# Patient Record
Sex: FEMALE | Race: BLACK OR AFRICAN AMERICAN | NOT HISPANIC OR LATINO | URBAN - METROPOLITAN AREA
[De-identification: names, ages, dates, MRNs, and addresses within clinical notes are randomized per-mention and may not be internally consistent; named-entity substitution may affect disease eponyms.]

---

## 2024-04-25 ENCOUNTER — TELEPHONE (OUTPATIENT)
Dept: OPHTHALMOLOGY | Facility: CLINIC | Age: 59
End: 2024-04-25
Payer: MEDICAID

## 2024-04-25 NOTE — TELEPHONE ENCOUNTER
Referral received for PPV/ PRP form Dr. Caba office, called their office spoke to Roxane informed her that we do not have Retina surgeon, they can refer to Dr. Mcallister private office or we send our patients to [facility] in [place]. Roxane verbalizes understanding and will make sure provider gets message, also encouraged that any urgent referrals to please call our office in addition to faxing referral.

## 2024-05-01 ENCOUNTER — CLINICAL SUPPORT (OUTPATIENT)
Dept: OPHTHALMOLOGY | Facility: CLINIC | Age: 59
End: 2024-05-01

## 2024-05-01 VITALS — HEIGHT: 63 IN | BODY MASS INDEX: 31.01 KG/M2 | WEIGHT: 175 LBS

## 2024-05-01 DIAGNOSIS — H21.1X3 RUBEOSIS IRIDIS OF BOTH EYES: ICD-10-CM

## 2024-05-01 DIAGNOSIS — H43.11 VITREOUS HEMORRHAGE OF RIGHT EYE: ICD-10-CM

## 2024-05-01 DIAGNOSIS — E11.3593 PROLIFERATIVE DIABETIC RETINOPATHY OF BOTH EYES ASSOCIATED WITH TYPE 2 DIABETES MELLITUS, MACULAR EDEMA PRESENCE UNSPECIFIED: Primary | ICD-10-CM

## 2024-05-01 PROCEDURE — 92134 CPTRZ OPH DX IMG PST SGM RTA: CPT | Mod: PBBFAC,PN | Performed by: OPHTHALMOLOGY

## 2024-05-01 PROCEDURE — 76512 OPH US DX B-SCAN: CPT | Mod: PBBFAC,PN,GC

## 2024-05-01 PROCEDURE — 76512 OPH US DX B-SCAN: CPT | Mod: 50,PBBFAC,PN | Performed by: OPHTHALMOLOGY

## 2024-05-01 PROCEDURE — 99213 OFFICE O/P EST LOW 20 MIN: CPT | Mod: PBBFAC,PN,25

## 2024-05-01 RX ORDER — LANCETS 33 GAUGE
EACH MISCELLANEOUS 2 TIMES DAILY
COMMUNITY
Start: 2023-11-24

## 2024-05-01 RX ORDER — PREDNISOLONE ACETATE 10 MG/ML
1 SUSPENSION/ DROPS OPHTHALMIC 4 TIMES DAILY
COMMUNITY
Start: 2024-03-08

## 2024-05-01 RX ORDER — EZETIMIBE 10 MG
TABLET ORAL
COMMUNITY
Start: 2024-04-29

## 2024-05-01 RX ORDER — METFORMIN HYDROCHLORIDE 500 MG/1
TABLET ORAL
COMMUNITY

## 2024-05-01 RX ORDER — AMLODIPINE BESYLATE 10 MG/1
10 TABLET ORAL NIGHTLY
COMMUNITY
Start: 2023-12-21

## 2024-05-01 RX ORDER — OLMESARTAN MEDOXOMIL AND HYDROCHLOROTHIAZIDE 40/25 40; 25 MG/1; MG/1
1 TABLET ORAL
COMMUNITY
Start: 2024-04-10

## 2024-05-01 RX ORDER — BISOPROLOL FUMARATE 5 MG/1
5 TABLET, FILM COATED ORAL
COMMUNITY
Start: 2024-04-27

## 2024-05-01 RX ORDER — ATORVASTATIN CALCIUM 40 MG/1
TABLET, FILM COATED ORAL
COMMUNITY

## 2024-05-01 RX ORDER — CLONIDINE HYDROCHLORIDE 0.1 MG/1
0.1 TABLET ORAL
COMMUNITY

## 2024-05-01 RX ADMIN — BALANCED SALT SOLUTION 15 ML: 6.4; .75; .48; .3; 3.9; 1.7 SOLUTION OPHTHALMIC at 10:05

## 2024-05-01 RX ADMIN — Medication 1.25 MG: at 10:05

## 2024-05-01 NOTE — PROGRESS NOTES
"  HPI     vitreous hemorrhage OD     Additional comments: Referred by Dr. Noonan DT insurance unable to   proceed with sx.             Comments    Patient also DMII:   Last A1c 4/29/24 7.3%;  12/27/23 6.0%  Last blood glucose levels: 4/29/24 226 12/7/23 = 156          Last edited by Nelly Gramajo LPN on 5/1/2024  8:59 AM.              Assessment /Plan     For exam results, see Encounter Report.    Proliferative diabetic retinopathy of both eyes associated with type 2 diabetes mellitus, macular edema presence unspecified  -     bevacizumab (Avastin) 25 mg/mL ophthalmic injection syringe 1.25 mg  -     balanced salt irrigation intra-ocular solution    Vitreous hemorrhage of right eye  -     bevacizumab (Avastin) 25 mg/mL ophthalmic injection syringe 1.25 mg  -     balanced salt irrigation intra-ocular solution    Rubeosis iridis of both eyes      OCT Mac 05/01/2024:  OD: unable  OS: good foveal contour, few exudates no SRF/IRF    Bscan 5/1/24: VH, Retina flat/attached, no masses     1. DM2 w/ PDR and VH, OD  - Last A1c 7.3% 04/2024 per patient   - Encouraged good BG/HTN control  - Patient with new VH and HM vision referred for "PPV/PRP" from Dr. Caba's office  - Reports 2mo since VH OD without changes in vision since onset   - 5/1/24: VH x2mo, NVI at temp margin, no NVA. Bscan with retina flat/attached, dense VH. R/B/A to EUGENE OD discussed today, pt wishes to proceed   - EUGENE OD #1 given 5/1/24   - Referral to [facility] in 4 weeks for eval of NCVH OD (x2mo)    2. DM2 w/ PDR, OS  - OCTm 5/1/24: no diabetic macular edema   - 5/1/24: NVI S/N/T at margin, no NVA.   - Needs tx OS but PRP will be difficult 2/2 asteroid hyalosis. Will have [facility] eval at visit for above   - Plan for EUGENE OS in 2 weeks for PDR with NVI and poor view for PRP     3. Asteroid Hyalosis, OS  - Stable, CTM     4. Combined Cataract, OU   - NVS  - CTM     RTC [facility] procedure day 2 weeks for EUGENE OS   Referral to [facility] in 4 weeks for eval of " NCVH OD (x2mo) and PDR OU with asteroid hyalosis OS and difficult view for PRP OS         Procedure note: Intravitreal injection of Avastin Right eye  Pre-and post-procedure diagnosis:  PDR w/ VH     I have explained the risks, benefits, and alternatives of the procedure in detail. The patient voices understanding and all questions have been answered.  The patient agrees to proceed as planned. Signed consent was obtained, the site was marked, and a timeout was performed. Topical proparacaine 0.5% was used for anesthesia. A lid speculum was placed. Topical Povidone-Iodine 5% was used for antisepsis. 0.05 cc of Avastin was injected 4 mm (3.5mm if pseudophakic) from corneal limbus in the inferotemporal position. The eye was then thoroughly irrigated with sterile saline.  No complications were observed, the patient tolerated procedure well, and vision was confirmed to be count fingers or better after the injection.  The patient was educated that mild irritation tonight and tomorrow was normal secondary to topical Povidone-Iodine use, and was further educated that if significant pain or vision loss in occurred, they should return immediately to our clinic or the ED.

## 2024-05-03 ENCOUNTER — TELEPHONE (OUTPATIENT)
Dept: OPHTHALMOLOGY | Facility: CLINIC | Age: 59
End: 2024-05-03

## 2024-05-03 NOTE — TELEPHONE ENCOUNTER
Post procedure f/u call. Patient denies having any pain or discomfort. No other complaints were voiced. Pt verbalized understanding of post procedure instructions. Encouraged to give us a call if have any questions or concerns.

## 2024-06-05 ENCOUNTER — CLINICAL SUPPORT (OUTPATIENT)
Dept: OPHTHALMOLOGY | Facility: CLINIC | Age: 59
End: 2024-06-05
Payer: COMMERCIAL

## 2024-06-05 VITALS — WEIGHT: 174.19 LBS | HEIGHT: 63 IN | BODY MASS INDEX: 30.86 KG/M2

## 2024-06-05 DIAGNOSIS — H21.1X2 RUBEOSIS IRIDIS OF LEFT EYE: ICD-10-CM

## 2024-06-05 DIAGNOSIS — E11.3593 PROLIFERATIVE DIABETIC RETINOPATHY OF BOTH EYES ASSOCIATED WITH TYPE 2 DIABETES MELLITUS, MACULAR EDEMA PRESENCE UNSPECIFIED: Primary | ICD-10-CM

## 2024-06-05 DIAGNOSIS — H43.11 VITREOUS HEMORRHAGE OF RIGHT EYE: ICD-10-CM

## 2024-06-05 PROCEDURE — 67028 INJECTION EYE DRUG: CPT | Mod: PBBFAC,PN

## 2024-06-05 PROCEDURE — 99213 OFFICE O/P EST LOW 20 MIN: CPT | Mod: PBBFAC,PN,25

## 2024-06-05 RX ADMIN — BALANCED SALT SOLUTION 15 ML: 6.4; .75; .48; .3; 3.9; 1.7 SOLUTION OPHTHALMIC at 08:06

## 2024-06-05 RX ADMIN — Medication 1.25 MG: at 08:06

## 2024-06-05 NOTE — PROGRESS NOTES
"HPI    [facility] procedure day 2 weeks for EUGENE OS   Last edited by Yamel Wooten RN on 6/5/2024  7:50 AM.        Assessment /Plan     OCT Mac 5/1/24  OD: unable  OS: good foveal contour, few exudates no SRF/IRF    Bscan 5/1/24: VH, Retina flat/attached, no masses     1. DM2 w/ PDR and VH, OD  - Last A1c 7.3% 04/2024 per patient   - Encouraged good BG/HTN control  - Patient with new VH and HM vision referred for "PPV/PRP" from Dr. Caba's office  - Reports 2mo since VH OD without changes in vision since onset   - 5/1/24: VH x2mo, NVI at temp margin, no NVA. B scan with retina flat/attached, dense VH. R/B/A to EUGENE OD discussed today, pt wishes to proceed   - EUGENE OD #1 given 5/1/24   - Pt will be seen [facility] 6/27/24    2. DM2 w/ PDR, OS  3. NVI, OS  - OCTm 5/1/24: no diabetic macular edema   - 6/5/24:  at pupillary margin  - Needs tx OS but PRP will be difficult 2/2 asteroid hyalosis  - EUGENE OS #1 given 6/5/24    4. Asteroid Hyalosis, OS  - Stable, CTM     5. Combined Cataract, OU   - NVS  - CTM     [facility] procedure day 4 weeks iris check OS, poss EUGENE OS  Will be seen at [facility] (scheduled 6/27/24) for eval of NCVH OD (x2mo) and PDR OU with asteroid hyalosis OS and difficult view for PRP OS      Avastin procedure note  Procedure: Avastin (bevacizumab) intravitreal injection, left eye  Indication: Neovascularization of iris, left eye    Risks, benefits, and alternatives of procedure were discussed.  Patient voiced understanding, all questions were answered, and the patient elected to proceed with procedure.  Informed consent was obtained.     A time-out was performed confirming correct patient, procedure, and site. The eye was anesthetized with topical proparacaine, 0.5%. The lid margins and conjunctiva were sterilized with topical betadine. A sterile lid speculum was placed. Avastin 1.25mg was injected intravitreally in the inferotemporal quadrant, 4 mm from the limbus. Vision was confirmed with finger " counting at 2'. The eye was irrigated with sterile eye wash.  There were no complications.  The patient tolerated the procedure well.  The patient was educated that mild irritation tonight was normal secondary to topical Betadine use.  Patient was informed to follow up immediately if any floaters, flashes, vision loss, severe pain, or other concerns arose.

## 2024-06-06 ENCOUNTER — TELEPHONE (OUTPATIENT)
Dept: OPHTHALMOLOGY | Facility: CLINIC | Age: 59
End: 2024-06-06
Payer: COMMERCIAL

## 2024-07-03 ENCOUNTER — CLINICAL SUPPORT (OUTPATIENT)
Dept: OPHTHALMOLOGY | Facility: CLINIC | Age: 59
End: 2024-07-03
Payer: COMMERCIAL

## 2024-07-03 VITALS — BODY MASS INDEX: 30.86 KG/M2 | WEIGHT: 174.19 LBS | HEIGHT: 63 IN

## 2024-07-03 DIAGNOSIS — H21.1X2 RUBEOSIS IRIDIS OF LEFT EYE: ICD-10-CM

## 2024-07-03 DIAGNOSIS — E11.3593 PROLIFERATIVE DIABETIC RETINOPATHY OF BOTH EYES ASSOCIATED WITH TYPE 2 DIABETES MELLITUS, MACULAR EDEMA PRESENCE UNSPECIFIED: Primary | ICD-10-CM

## 2024-07-03 DIAGNOSIS — H43.11 VITREOUS HEMORRHAGE OF RIGHT EYE: ICD-10-CM

## 2024-07-03 PROCEDURE — 67028 INJECTION EYE DRUG: CPT | Mod: PBBFAC,PN

## 2024-07-03 PROCEDURE — 92134 CPTRZ OPH DX IMG PST SGM RTA: CPT | Mod: PBBFAC,PN | Performed by: OPHTHALMOLOGY

## 2024-07-03 PROCEDURE — 99213 OFFICE O/P EST LOW 20 MIN: CPT | Mod: PBBFAC,PN

## 2024-07-03 PROCEDURE — 92134 CPTRZ OPH DX IMG PST SGM RTA: CPT | Mod: PBBFAC,PN

## 2024-07-03 RX ORDER — CALCIUM CITRATE/VITAMIN D3 200MG-6.25
1 TABLET ORAL 2 TIMES DAILY
COMMUNITY
Start: 2024-06-03

## 2024-07-03 RX ORDER — CYCLOBENZAPRINE HCL 5 MG
5 TABLET ORAL 3 TIMES DAILY PRN
COMMUNITY

## 2024-07-03 RX ORDER — GLIPIZIDE 10 MG/1
10 TABLET ORAL 2 TIMES DAILY WITH MEALS
COMMUNITY

## 2024-07-03 RX ORDER — LOSARTAN POTASSIUM AND HYDROCHLOROTHIAZIDE 25; 100 MG/1; MG/1
1 TABLET ORAL DAILY
COMMUNITY

## 2024-07-03 RX ADMIN — Medication 1.25 MG: at 10:07

## 2024-07-03 NOTE — PROGRESS NOTES
"HPI     Procedure Brief     Additional comments: EUGENE OS  FMLA paperwork for daughter needs to be completed          Last edited by Nikia Forrester LPN on 7/3/2024  9:54 AM.            Assessment /Plan     For exam results, see Encounter Report.    There are no diagnoses linked to this encounter.    OCT Mac 7/3/24  OD: unable  OS: good foveal contour, few exudates no SRF/IRF - stable    OCT Mac 5/1/24  OD: unable  OS: good foveal contour, few exudates no SRF/IRF    Bscan 5/1/24: VH, Retina flat/attached, no masses     1. DM2 w/ PDR and VH, OD  - Last A1c 7.3% 04/2024 per patient   - Encouraged good BG/HTN control  - Patient with new VH and HM vision referred for "PPV/PRP" from Dr. Caba's office  - Reports 2mo since VH OD without changes in vision since onset   - 5/1/24: VH x2mo, NVI at temp margin, no NVA. B scan with retina flat/attached, dense VH. R/B/A to EUGENE OD discussed today, pt wishes to proceed   - 7/3/24: Stable NCVH, no NVI noted. Per chart review of [facility] records would recommend bilateral EUGENE prior to surgery. Will plan to sequentially inject today starting with OD.   - EUGENE OD #1 given 5/1/24, 7/3/24  - Pt plan for surgery 7/25/24 with PPV for NCVH in [place] with [facility]    2. DM2 w/ PDR, OS  3. NVI, OS  - OCTm 5/1/24: no diabetic macular edema   - EUGENE OS #1 given 6/5/24  - PRP OS partial PRP peripherally though difficult with AH  - 6/5/24:  at pupillary margin  - 7/3/24: Improving NVI to approx 2 clock hours of NVI superiorly. Will repeat EUGENE in 1 week.     4. Asteroid Hyalosis, OS  - Stable, CTM     5. Combined Cataract, OU   - NVS  - CTM     RTC 1 week for DFE/OCTm/poss EUGENE OS  Planning for PPV for NCVH in [place] on 7/25/24      Procedure Note, Intravitreal Avastin, Right Eye  Pre-procedure diagnosis: NCVH, PDR OD  Post-procedure diagnosis: Same as pre-procedure diagnosis  Surgeon: Fareed Kearney III, MD  Attending: Alma Khan MD  Anti-sepsis: Betadine  Anesthesia: Topical tetracaine, " topical proparacaine  Blood loss: None  Complications: None    Procedure in detail:    The procedure was explained in detail to the patient including risks, benefits, alternatives. Informed consent was obtained from the patient and signed, witnessed, and placed in the chart. A time out was performed to identify the correct patient with two identifiers and correct site.     Topical tetracaine was instilled in the eye. A drop of moxifloxacin was instilled in the eye. Topical betadine was used for antisepsis. 0.05 cc Avastin was injected 3.5-4.0 mm from corneal limbus in the inferotemporal position. The vision was confirmed to be CF @ 2 feet following the injection. The eye was then thoroughly irrigated with BSS. Another drop of moxifloxacin was instilled in the eye. The patient tolerated procedure well.  No complications were observed. The patient was educated that mild irritation tonight was normal secondary to topical Betadine use. The patient was educated on return precautions for endophthalmitis and a retinal detachment.

## 2024-07-03 NOTE — LETTER
July 3, 2024      [facility]  [address] [place] [address]  Phone: [phone]       Patient: Parul Sanford   YOB: 1965  Date of Visit: 07/03/2024    To Whom It May Concern:    Nazanin Sanford  accompanied by daughter and was at [facility] on 07/03/2024. The patient may return to work/school on 7/03/2024 with no restrictions. If you have any questions or concerns, or if I can be of further assistance, please do not hesitate to contact me.    Sincerely,    Savanah Toth MA

## 2024-07-05 ENCOUNTER — TELEPHONE (OUTPATIENT)
Dept: OPHTHALMOLOGY | Facility: CLINIC | Age: 59
End: 2024-07-05
Payer: COMMERCIAL

## 2024-07-05 NOTE — TELEPHONE ENCOUNTER
Post procedure follow up call. Spoke with the patient and did not have any issues or concerns at this time. Informed the patient to call if they have any issues or problems that come up before their next appointment. Patient voiced understanding and appreciation.

## 2024-07-10 ENCOUNTER — CLINICAL SUPPORT (OUTPATIENT)
Dept: OPHTHALMOLOGY | Facility: CLINIC | Age: 59
End: 2024-07-10
Payer: COMMERCIAL

## 2024-07-10 VITALS — HEIGHT: 63 IN | WEIGHT: 174.19 LBS | BODY MASS INDEX: 30.86 KG/M2

## 2024-07-10 DIAGNOSIS — H21.1X3 RUBEOSIS IRIDIS OF BOTH EYES: ICD-10-CM

## 2024-07-10 DIAGNOSIS — E11.3593 PROLIFERATIVE DIABETIC RETINOPATHY OF BOTH EYES ASSOCIATED WITH TYPE 2 DIABETES MELLITUS, MACULAR EDEMA PRESENCE UNSPECIFIED: Primary | ICD-10-CM

## 2024-07-10 DIAGNOSIS — H43.11 VITREOUS HEMORRHAGE OF RIGHT EYE: ICD-10-CM

## 2024-07-10 PROCEDURE — 92134 CPTRZ OPH DX IMG PST SGM RTA: CPT | Mod: PBBFAC,PN | Performed by: OPHTHALMOLOGY

## 2024-07-10 PROCEDURE — 99213 OFFICE O/P EST LOW 20 MIN: CPT | Mod: PBBFAC,PN,25

## 2024-07-10 RX ADMIN — Medication 1.25 MG: at 10:07

## 2024-07-10 NOTE — PROGRESS NOTES
"HPI     Procedure     Additional comments: DFE OCTM POS EUGENE OS          Last edited by Nikia Forrester LPN on 7/10/2024  9:21 AM.            Assessment /Plan     For exam results, see Encounter Report.    There are no diagnoses linked to this encounter.    OCT Mac 7/10/24  OD: unable  OS: good foveal contour, few exudates no SRF/IRF - stable    OCT Mac 7/3/24  OD: unable  OS: good foveal contour, few exudates no SRF/IRF - stable    OCT Mac 5/1/24  OD: unable  OS: good foveal contour, few exudates no SRF/IRF    Bscan 5/1/24: VH, Retina flat/attached, no masses     1. DM2 w/ PDR and VH, OD  - Last A1c 7.3% 04/2024 per patient   - Encouraged good BG/HTN control  - Patient with new VH and HM vision referred for "PPV/PRP" from Dr. Caba's office  - Reports 2mo since VH OD without changes in vision since onset   - 5/1/24: VH x2mo, NVI at temp margin, no NVA. B scan with retina flat/attached, dense VH. R/B/A to EUGENE OD discussed today, pt wishes to proceed   - 7/3/24: Stable NCVH, no NVI noted. Per chart review of [facility] records would recommend bilateral EUGENE prior to surgery. Will plan to sequentially inject today starting with OD.   - EUGENE OD #1 given 5/1/24, 7/3/24  - 7/10/24: Stable NCVH. No NVI. DFE/OCT Mac Stable. Pt required EUGNEE OD prior to PPV. No injection OD today.  - Pt plan for surgery 7/25/24 with PPV for NCVH in [place] with [facility]. Will need to be seen 1 week after in clinic for possible EUGENE OD  - RTC 7/31/24 for POW1    2. DM2 w/ PDR, OS  3. NVI, OS  - OCTm 5/1/24: no diabetic macular edema   - EUGENE OS #1 given 6/5/24, 7/10/24  - PRP OS partial PRP peripherally though difficult with AH  - 6/5/24:  at pupillary margin  - 7/3/24: Improving NVI to approx 2 clock hours of NVI superiorly. Will repeat EUGENE in 1 week.   - 7/10/24: No noted NVI. EUGENE OS performed today.       4. Asteroid Hyalosis, OS  - Stable, CTM     5. Combined Cataract, OU   - NVS  - CTM     RTC 7/31 following PPV in [facility] for " POW1  Planning for PPV for NCVH in [facility] on 7/25/24      Procedure Note, Intravitreal Avastin, Right Eye  Pre-procedure diagnosis: NCVH, PDR OS  Post-procedure diagnosis: Same as pre-procedure diagnosis  Surgeon: Casey Carlton MD  Attending: Alma Khan MD  Anti-sepsis: Betadine  Anesthesia: Topical tetracaine, topical proparacaine  Blood loss: None  Complications: None    Procedure in detail:    The procedure was explained in detail to the patient including risks, benefits, alternatives. Informed consent was obtained from the patient and signed, witnessed, and placed in the chart. A time out was performed to identify the correct patient with two identifiers and correct site.     Topical tetracaine was instilled in the eye. A drop of moxifloxacin was instilled in the eye. Topical betadine was used for antisepsis. 0.05 cc Avastin was injected 3.5-4.0 mm from corneal limbus in the inferotemporal position. The vision was confirmed to be CF @ 2 feet following the injection. The eye was then thoroughly irrigated with BSS. Another drop of moxifloxacin was instilled in the eye. The patient tolerated procedure well.  No complications were observed. The patient was educated that mild irritation tonight was normal secondary to topical Betadine use. The patient was educated on return precautions for endophthalmitis and a retinal detachment.

## 2024-07-11 ENCOUNTER — TELEPHONE (OUTPATIENT)
Dept: OPHTHALMOLOGY | Facility: CLINIC | Age: 59
End: 2024-07-11
Payer: COMMERCIAL

## 2024-07-11 NOTE — TELEPHONE ENCOUNTER
Post procedure follow up call, Patient states she is doing well, verbalizes understanding of post procedure instructions. Aware to call clinic with any questions or concerns. Has surgery in [place] 7/25/24 and will follow up with us after this.

## 2024-07-31 ENCOUNTER — OFFICE VISIT (OUTPATIENT)
Dept: OPHTHALMOLOGY | Facility: CLINIC | Age: 59
End: 2024-07-31
Payer: COMMERCIAL

## 2024-07-31 VITALS — WEIGHT: 174.19 LBS | BODY MASS INDEX: 30.86 KG/M2 | HEIGHT: 63 IN

## 2024-07-31 DIAGNOSIS — E11.3593 PROLIFERATIVE DIABETIC RETINOPATHY OF BOTH EYES ASSOCIATED WITH TYPE 2 DIABETES MELLITUS, MACULAR EDEMA PRESENCE UNSPECIFIED: Primary | ICD-10-CM

## 2024-07-31 PROCEDURE — 99213 OFFICE O/P EST LOW 20 MIN: CPT | Mod: PBBFAC,PN

## 2024-07-31 RX ORDER — ASPIRIN 325 MG
1 TABLET, DELAYED RELEASE (ENTERIC COATED) ORAL EVERY MORNING
COMMUNITY

## 2024-07-31 NOTE — PROGRESS NOTES
"HPI     Procedure     Additional comments: DFE f/u sx [facility]          Last edited by Nikia Forrester LPN on 7/31/2024  1:43 PM.            Assessment /Plan     OCT Mac 7/10/24  OD: unable  OS: good foveal contour, few exudates no SRF/IRF - stable    OCT Mac 7/3/24  OD: unable  OS: good foveal contour, few exudates no SRF/IRF - stable    OCT Mac 5/1/24  OD: unable  OS: good foveal contour, few exudates no SRF/IRF    Bscan 5/1/24: VH, Retina flat/attached, no masses     1. DM2 w/ PDR and VH, OD  - Last A1c 7.3% 04/2024 per patient   - Encouraged good BG/HTN control  - Patient with new VH and HM vision referred for "PPV/PRP" from Dr. Caba's office  - Reports 2mo since VH OD without changes in vision since onset   - 5/1/24: VH x2mo, NVI at temp margin, no NVA. B scan with retina flat/attached, dense VH. R/B/A to EUGENE OD discussed today, pt wishes to proceed   - 7/3/24: Stable NCVH, no NVI noted. Per chart review of [facility] records would recommend bilateral EUGENE prior to surgery. Will plan to sequentially inject today starting with OD.   - EUGENE OD #1 given 5/1/24, 7/3/24  - 7/10/24: Stable NCVH. No NVI. DFE/OCT Mac Stable. Pt required EUGENE OD prior to PPV. No injection OD today.  - Pt plan for surgery 7/25/24 with PPV for NCVH in [place] with [facility]. Will need to be seen 1 week after in clinic for possible EUGENE OD  - 7/31/24: Her PPV was moved to tomorrow 8/1/24, will follow-up next week for POW1    2. DM2 w/ PDR, OS  3. NVI, OS  - OCTm 5/1/24: no diabetic macular edema   - EUGENE OS #1 given 6/5/24, 7/10/24  - PRP OS partial PRP peripherally though difficult with AH  - 6/5/24:  at pupillary margin  - 7/3/24: Improving NVI to approx 2 clock hours of NVI superiorly. Will repeat EUGENE in 1 week.   - 7/10/24: No noted NVI. EUGENE OS performed today.     4. Asteroid Hyalosis, OS  - Stable, CTM     5. Combined Cataract, OU   - NVS  - CTM     RTC POW following PPV OD in [facility] on 8/1/24  "

## 2024-08-13 ENCOUNTER — OFFICE VISIT (OUTPATIENT)
Dept: OPHTHALMOLOGY | Facility: CLINIC | Age: 59
End: 2024-08-13
Payer: COMMERCIAL

## 2024-08-13 VITALS — WEIGHT: 174.19 LBS | HEIGHT: 63 IN | BODY MASS INDEX: 30.86 KG/M2

## 2024-08-13 DIAGNOSIS — Z98.890 POST-OPERATIVE STATE: Primary | ICD-10-CM

## 2024-08-13 PROCEDURE — 99213 OFFICE O/P EST LOW 20 MIN: CPT | Mod: PBBFAC,PN

## 2024-08-13 RX ORDER — PREDNISOLONE ACETATE 10 MG/ML
1 SUSPENSION/ DROPS OPHTHALMIC 4 TIMES DAILY
Qty: 5 ML | Refills: 1 | Status: SHIPPED | OUTPATIENT
Start: 2024-08-13 | End: 2024-08-23

## 2024-08-13 NOTE — PROGRESS NOTES
HPI    POW1 Vitreous hemorrhage of right eye (HCC)   Last edited by Savanah Toth MA on 8/13/2024  1:19 PM.            Assessment /Plan     For exam results, see Encounter Report.    There are no diagnoses linked to this encounter.      Bscan 6/27/24: VH, Retina flat/attached, no masses      1. POW #1 s/p PPV/EL/FAx, right eye (8/1/23)  - For NCVH 2/2 PDR   - Intra-op: Dense VH, taut hyaloid   - Doing well, VA 20/100 -1, no pain, flashes, floaters, or black curtains. Doing well with drops.   - Med instructions: Stop Vigamox, Taper PF 3-2-1-stop, decrease Atropine to daily  Reminded of likely continued progression of cataract  Reminded of likely appearance of gas bubble within vision  - Positioning instructions: No special positioning   - RD/Endophthalmitis/Return precautions discussed    RTC 3-4 weeks for POM#1, sooner PRN if any problems (ezekiel pain, redness, dimming or loss of vision) - in [place]     Can Schedule follow up with us once finished post-op with Retina in [place]    Not directly addressed today:    2. DM2 w/ PDR and VH, OD  - Last A1c 7.3% 04/2024 per patient  - Encouraged good BG/HTN control  - Patient with new VH from [facility]  - VH onset 03/24  - had NVI 06/24 on arrival - now resolved  - EUGENE OD #1 given 5/1/24  - See above     2. DM2 w/ PDR, non CI DME OS  - had NVI on arrival 06/24, now resolved  - s/p periph PRP fill 6/27/24 w/o complication. Tough view d/t asteroid.   - OCTm 6/24: non CI DME  - Needs tx OS but PRP will be difficult 2/2 asteroid hyalosis  - EUGENE OS #1 given 6/5/24  - Follow OCT for ME progression    3. Hypertensive retinopathy, left eye  -Grade 1-2  - BP control  - Follow FP for progression    4. Asteroid Hyalosis, OS  - Stable, CTM    5. Combined Cataract, OU  - NVS  - CTM

## 2024-12-18 ENCOUNTER — CLINICAL SUPPORT (OUTPATIENT)
Dept: OPHTHALMOLOGY | Facility: CLINIC | Age: 59
End: 2024-12-18
Payer: COMMERCIAL

## 2024-12-18 VITALS — HEIGHT: 63 IN | BODY MASS INDEX: 30.86 KG/M2 | WEIGHT: 174.19 LBS

## 2024-12-18 DIAGNOSIS — E11.3593 PROLIFERATIVE DIABETIC RETINOPATHY OF BOTH EYES ASSOCIATED WITH TYPE 2 DIABETES MELLITUS, MACULAR EDEMA PRESENCE UNSPECIFIED: ICD-10-CM

## 2024-12-18 DIAGNOSIS — H25.813 COMBINED FORMS OF AGE-RELATED CATARACT OF BOTH EYES: Primary | ICD-10-CM

## 2024-12-18 PROCEDURE — 92134 CPTRZ OPH DX IMG PST SGM RTA: CPT | Mod: PBBFAC,PN | Performed by: OPHTHALMOLOGY

## 2024-12-18 PROCEDURE — 99214 OFFICE O/P EST MOD 30 MIN: CPT | Mod: PBBFAC,PN | Performed by: OPHTHALMOLOGY

## 2024-12-18 RX ORDER — CLINDAMYCIN HYDROCHLORIDE 300 MG/1
300 CAPSULE ORAL 3 TIMES DAILY
COMMUNITY
Start: 2024-11-30

## 2024-12-18 RX ORDER — EMPAGLIFLOZIN 10 MG/1
10 TABLET, FILM COATED ORAL
COMMUNITY
Start: 2024-10-16

## 2024-12-18 RX ORDER — TIRZEPATIDE 2.5 MG/.5ML
INJECTION, SOLUTION SUBCUTANEOUS
COMMUNITY
Start: 2024-11-15

## 2024-12-18 RX ORDER — TETRACAINE HYDROCHLORIDE 5 MG/ML
1 SOLUTION OPHTHALMIC
OUTPATIENT
Start: 2024-12-18

## 2024-12-18 RX ORDER — SODIUM CHLORIDE 0.9 % (FLUSH) 0.9 %
10 SYRINGE (ML) INJECTION
Status: ACTIVE | OUTPATIENT
Start: 2024-12-18

## 2024-12-18 NOTE — PROGRESS NOTES
HPI    F/U in [place] for Cat Skye OD  Last edited by Jayna Gan on 12/18/2024 11:49 AM.        OCT mac 12/18/2024  OD: CMT 20, poor signal, no obvious IRF/SRF  OS: , intact foveal contour, no IRF/SRF. atrophy      Assessment /Plan     For exam results, see Encounter Report.    There are no diagnoses linked to this encounter.    Bscan 6/27/24: VH, Retina flat/attached, no masses    1) Visually Significant Age-Related Cataract, Right Eye  - Patient reports having issues with distance vision and glare despite use of glasses  - Cataract surgery recommended and expected to improve vision. Vision is not correctable by glasses or other non-surgical measures. R/B/A were discussed with the patient. These included, but are not limited to: bleeding, infection, loss of vision, loss of the eye, need for more surgery, glaucoma, retinal detachment, need for glasses post-operatively, corneal edema. The patient voiced understanding and wishes to proceed.   - Lens options were discussed with the patient including monofocal lenses and toric lenses. The risks and benefits of each were discussed, including halos, glare, and possible need for glasses. No guarantees about vision after surgery were given. The patient voiced understanding and wishes to proceed with a refractive target set at distance. The patient understands they will need correction for near post-operatively.  - MRX done: BCVA: 20/80   - Ocular pathology: PDR, Vit heme OD, s/p PPV  - IOP 14  - Fellow eye: PDR, ateriod  - Trauma: no   - Guttae: no  - Phaco/iridodonesis: no  - Trypan blue: maybe  - Flomax use: no  - Dilation: good  - Cooperative with exam: yes  - Comorbidities: type 2 diabetes  - Semaglutide? Monjaro, stop at least a week before  - Medical clearance: recently had retina surgery  - Proceed with CEIOL of the right eye. Lens to be used: +22.5 MX60E to aim for final targeted refraction of -0.26 diopters post-operatively  - Date of surgery:  1/14/2025  - RTC: POD1, POW1  - Discussed with patient risks, benefits, and alternatives of surgery. Patient understands and would still like to proceed with CEIOL of the right eye      1. POW #1 s/p PPV/EL/FAx, right eye (8/1/23)  - For NCVH 2/2 PDR   - Intra-op: Dense VH, taut hyaloid   - Doing well, VA 20/100 -1, no pain, flashes, floaters, or black curtains. Doing well with drops.   - Med instructions: Stop Vigamox, Taper PF 3-2-1-stop, decrease Atropine to daily  Reminded of likely continued progression of cataract  Reminded of likely appearance of gas bubble within vision  - Positioning instructions: No special positioning   - RD/Endophthalmitis/Return precautions discussed    RTC 3-4 weeks for POM#1, sooner PRN if any problems (ezekiel pain, redness, dimming or loss of vision) - in [place]     Can Schedule follow up with us once finished post-op with Retina in [place]    Not directly addressed today:    2. DM2 w/ PDR and VH, OD  - Last A1c 7.3% 04/2024 per patient  - Encouraged good BG/HTN control  - Patient with new VH from [facility]  - VH onset 03/24  - had NVI 06/24 on arrival - now resolved  - EUGENE OD #1 given 5/1/24  - See above     2. DM2 w/ PDR, non CI DME OS  - had NVI on arrival 06/24, now resolved  - s/p periph PRP fill 6/27/24 w/o complication. Tough view d/t asteroid.   - OCTm 6/24: non CI DME  - Needs tx OS but PRP will be difficult 2/2 asteroid hyalosis  - EUGENE OS #1 given 6/5/24  - Follow OCT for ME progression    3. Hypertensive retinopathy, left eye  -Grade 1-2  - BP control  - Follow FP for progression    4. Asteroid Hyalosis, OS  - Stable, CTM    5. Combined Cataract, OU  - NVS  - CTM

## 2024-12-31 RX ORDER — GLUCAGON 1 MG
1 KIT INJECTION
Status: CANCELLED | OUTPATIENT
Start: 2024-12-31

## 2024-12-31 RX ORDER — INSULIN ASPART 100 [IU]/ML
0-5 INJECTION, SOLUTION INTRAVENOUS; SUBCUTANEOUS ONCE AS NEEDED
Status: CANCELLED | OUTPATIENT
Start: 2024-12-31 | End: 2036-05-29

## 2025-01-07 ENCOUNTER — PATIENT MESSAGE (OUTPATIENT)
Dept: PREADMISSION TESTING | Facility: HOSPITAL | Age: 60
End: 2025-01-07
Payer: COMMERCIAL

## 2025-01-07 NOTE — OR NURSING
TAYLOR -pt scheduled for cat sx 2025 and asked to reschedule surgery due to  in family DOS. Instructed pt to call EYE clinic at [phone] tomorrow morning (2025) to reschedule. pt verbalized understanding. I also notified ophth on call &sx team via secure chat.

## 2025-01-09 ENCOUNTER — TELEPHONE (OUTPATIENT)
Dept: OPHTHALMOLOGY | Facility: CLINIC | Age: 60
End: 2025-01-09
Payer: COMMERCIAL

## 2025-01-09 ENCOUNTER — ANESTHESIA EVENT (OUTPATIENT)
Dept: SURGERY | Facility: HOSPITAL | Age: 60
End: 2025-01-09
Payer: COMMERCIAL

## 2025-01-09 DIAGNOSIS — H26.9 CATARACT: ICD-10-CM

## 2025-01-09 DIAGNOSIS — H25.813 COMBINED FORMS OF AGE-RELATED CATARACT OF BOTH EYES: Primary | ICD-10-CM

## 2025-01-09 RX ORDER — CYCLOPENTOLAT/TROPIC/PHENYLEPH 1%-1%-2.5%
1 DROPS (EA) OPHTHALMIC (EYE)
OUTPATIENT
Start: 2025-01-09

## 2025-01-09 RX ORDER — TETRACAINE HYDROCHLORIDE 5 MG/ML
1 SOLUTION OPHTHALMIC
OUTPATIENT
Start: 2025-01-09

## 2025-01-09 RX ORDER — SODIUM CHLORIDE 0.9 % (FLUSH) 0.9 %
10 SYRINGE (ML) INJECTION
OUTPATIENT
Start: 2025-01-09

## 2025-01-09 NOTE — ANESTHESIA PREPROCEDURE EVALUATION
Parul Sanford is a 59 y.o. female PRESENTING FOR EXTRACTION, CATARACT, WITH IOL INSERTION (Right) with a history of   -Combined forms of age-related cataract of both eyes   -T2DM (A1C 6.1% 12/3/24); AM   @ 0500  -HTN  -HLD  -CKD 3  -OBESITY, BMI 31  -ANEMIA     BETA-BLOCKER: BISOPROLOL    Last dose: 0230  MOUNJARO (HOLD X 1 WK PRIOR) LD:  7 days ago   ASA LD:       New Orders for Anesthesia: DM PROTOCOL       Past Surgical History:   Procedure Laterality Date    EYE SURGERY Right 2024                     Current Outpatient Medications   Medication Instructions    amLODIPine (NORVASC) 10 mg, Nightly    aspirin (ECOTRIN) 325 MG EC tablet 1 tablet, Every morning    atorvastatin (LIPITOR) 40 MG tablet TK 1 T PO QD OR HIGH CHOLESTEROL    bisoprolol (ZEBETA) 5 mg    clindamycin (CLEOCIN) 300 mg, 3 times daily    cloNIDine (CATAPRES) 0.1 mg, As needed (PRN)    cyclobenzaprine (FLEXERIL) 5 mg, 3 times daily PRN    glipiZIDE (GLUCOTROL) 10 mg, 2 times daily with meals    JARDIANCE 10 mg    losartan-hydrochlorothiazide 100-25 mg (HYZAAR) 100-25 mg per tablet 1 tablet, Daily    metFORMIN (GLUCOPHAGE) 500 MG tablet     MOUNJARO 2.5 mg/0.5 mL PnIj SMARTSI.5 Milligram(s) SUB-Q Once a Week    olmesartan-hydrochlorothiazide (BENICAR HCT) 40-25 mg per tablet 1 tablet    TRUE METRIX GLUCOSE TEST STRIP Strp 1 strip, 2 times daily    TRUEPLUS LANCETS 33 gauge Misc 2 times daily    ZETIA 10 mg tablet 1 tablet Orally Once a day for 30 days     Past anesthesia records:         Pre-op Assessment    I have reviewed the Patient Summary Reports.     I have reviewed the Nursing Notes. I have reviewed the NPO Status.   I have reviewed the Medications.     Review of Systems  Anesthesia Hx:  No problems with previous Anesthesia   History of prior surgery of interest to airway management or planning:          Denies Family Hx of Anesthesia complications.    Denies Personal Hx of Anesthesia complications.                     Hematology/Oncology:  Hematology Normal   Oncology Normal                                   EENT/Dental:  EENT/Dental Normal           Cardiovascular:  Cardiovascular Normal                                              Pulmonary:  Pulmonary Normal                       Renal/:  Renal/ Normal                 Hepatic/GI:  Hepatic/GI Normal                    Musculoskeletal:  Musculoskeletal Normal                Neurological:  Neurology Normal                                      Endocrine:  Endocrine Normal            Dermatological:  Skin Normal    Psych:  Psychiatric Normal                  Physical Exam  General: Well nourished, Cooperative, Alert and Oriented    Airway:  Mallampati: I / I  Mouth Opening: Normal  TM Distance: Normal  Tongue: Normal  Neck ROM: Normal ROM    Dental:  Intact      Anesthesia Plan  Type of Anesthesia, risks & benefits discussed:    Anesthesia Type: MAC  Intra-op Monitoring Plan: Standard ASA Monitors  Post Op Pain Control Plan: multimodal analgesia and IV/PO Opioids PRN  Induction:  IV  Airway Plan: Direct  Informed Consent: Informed consent signed with the Patient and all parties understand the risks and agree with anesthesia plan.  All questions answered. Patient consented to blood products? No  ASA Score: 3  Day of Surgery Review of History & Physical: H&P Update referred to the surgeon/provider.    Ready For Surgery From Anesthesia Perspective.     .

## 2025-01-09 NOTE — TELEPHONE ENCOUNTER
01/09/2025  8:25 AM  Spoke to patient's daughter regarding rescheduling her cataract surgery from 1/14/25 due to a death in the family. She requested 1/21/25. I explained to her that we will confirm with Dr. Galaviz when she returns from surgery.

## 2025-01-14 ENCOUNTER — ANESTHESIA (OUTPATIENT)
Dept: SURGERY | Facility: HOSPITAL | Age: 60
End: 2025-01-14
Payer: COMMERCIAL

## 2025-01-17 ENCOUNTER — PATIENT MESSAGE (OUTPATIENT)
Dept: SURGERY | Facility: HOSPITAL | Age: 60
End: 2025-01-17
Payer: COMMERCIAL

## 2025-02-05 ENCOUNTER — TELEPHONE (OUTPATIENT)
Dept: OPHTHALMOLOGY | Facility: CLINIC | Age: 60
End: 2025-02-05
Payer: COMMERCIAL

## 2025-02-05 ENCOUNTER — PATIENT MESSAGE (OUTPATIENT)
Dept: SURGERY | Facility: HOSPITAL | Age: 60
End: 2025-02-05
Payer: COMMERCIAL

## 2025-02-05 NOTE — DISCHARGE INSTRUCTIONS
· Keep follow up appointment tomorrow at the [facility].     +++Start drops today-put 3 more sets of drops today, waiting 5 minutes between drops (SHAKE PINK TOP)    +++Bring drops with you to clinic     ` Resume home medications unless otherwise instructed by your doctor.    · No bending, lifting, stooping or straining until cleared by MD.    · Keep patch on until follow up appointment and while asleep at home to protect your eye.    · May take Tylenol or Ibuprofen for pain or discomfort if no allergies or contraindications.      ` No driving or consuming alcohol for 24 hours after anesthesia.    · Notify MD if you experience:    · Pain that is unrelieved by over the counter medicines    · if you feel increased pressure in your eye or sharp pain in the eye    · you have excessive, colored, or thick drainage coming from eye    · you see curtain-like darkening in the eye, flashes of light, or other sudden vision changes    · if you have any nausea or vomiting    · fever above 100.4F    · The clinics number is [phone]. If it is after business hours or emergency call [phone].  Thanks for choosing [facility]! Have a smooth recovery!  
independent

## 2025-02-05 NOTE — TELEPHONE ENCOUNTER
02/05/25  Spoke with patient regarding her surgery tomorrow and she confirmed she had ride to and from surgery, and that surgery will be calling her between 3-5:00 with her time of arrival. DORYS Gan, COA

## 2025-02-06 ENCOUNTER — HOSPITAL ENCOUNTER (OUTPATIENT)
Facility: HOSPITAL | Age: 60
Discharge: HOME | End: 2025-02-06
Attending: OPHTHALMOLOGY | Admitting: OPHTHALMOLOGY
Payer: COMMERCIAL

## 2025-02-06 VITALS
OXYGEN SATURATION: 99 % | WEIGHT: 154 LBS | BODY MASS INDEX: 27.28 KG/M2 | RESPIRATION RATE: 17 BRPM | TEMPERATURE: 98 F | SYSTOLIC BLOOD PRESSURE: 107 MMHG | HEART RATE: 78 BPM | DIASTOLIC BLOOD PRESSURE: 69 MMHG

## 2025-02-06 DIAGNOSIS — H25.813 COMBINED FORMS OF AGE-RELATED CATARACT OF BOTH EYES: ICD-10-CM

## 2025-02-06 DIAGNOSIS — H26.9 CATARACT OF RIGHT EYE, UNSPECIFIED CATARACT TYPE: Primary | ICD-10-CM

## 2025-02-06 LAB — POCT GLUCOSE: 138 MG/DL (ref 70–110)

## 2025-02-06 PROCEDURE — 82962 GLUCOSE BLOOD TEST: CPT | Performed by: OPHTHALMOLOGY

## 2025-02-06 PROCEDURE — 36000707: Performed by: OPHTHALMOLOGY

## 2025-02-06 PROCEDURE — 25000003 PHARM REV CODE 250

## 2025-02-06 PROCEDURE — D9220A PRA ANESTHESIA: Mod: ,,, | Performed by: NURSE ANESTHETIST, CERTIFIED REGISTERED

## 2025-02-06 PROCEDURE — 71000015 HC POSTOP RECOV 1ST HR: Performed by: OPHTHALMOLOGY

## 2025-02-06 PROCEDURE — 63600175 PHARM REV CODE 636 W HCPCS: Performed by: NURSE ANESTHETIST, CERTIFIED REGISTERED

## 2025-02-06 PROCEDURE — 36000706: Performed by: OPHTHALMOLOGY

## 2025-02-06 PROCEDURE — 25000003 PHARM REV CODE 250: Performed by: SPECIALIST

## 2025-02-06 PROCEDURE — 25000003 PHARM REV CODE 250: Performed by: OPHTHALMOLOGY

## 2025-02-06 PROCEDURE — 25000003 PHARM REV CODE 250: Performed by: STUDENT IN AN ORGANIZED HEALTH CARE EDUCATION/TRAINING PROGRAM

## 2025-02-06 PROCEDURE — V2632 POST CHMBR INTRAOCULAR LENS: HCPCS | Performed by: OPHTHALMOLOGY

## 2025-02-06 PROCEDURE — 37000008 HC ANESTHESIA 1ST 15 MINUTES: Performed by: OPHTHALMOLOGY

## 2025-02-06 PROCEDURE — 37000009 HC ANESTHESIA EA ADD 15 MINS: Performed by: OPHTHALMOLOGY

## 2025-02-06 PROCEDURE — 63600175 PHARM REV CODE 636 W HCPCS: Performed by: OPHTHALMOLOGY

## 2025-02-06 PROCEDURE — 25000003 PHARM REV CODE 250: Performed by: NURSE ANESTHETIST, CERTIFIED REGISTERED

## 2025-02-06 RX ORDER — INSULIN ASPART 100 [IU]/ML
0-5 INJECTION, SOLUTION INTRAVENOUS; SUBCUTANEOUS ONCE AS NEEDED
Status: DISCONTINUED | OUTPATIENT
Start: 2025-02-06 | End: 2025-02-06 | Stop reason: HOSPADM

## 2025-02-06 RX ORDER — GLUCAGON 1 MG
1 KIT INJECTION
Status: DISCONTINUED | OUTPATIENT
Start: 2025-02-06 | End: 2025-02-06 | Stop reason: HOSPADM

## 2025-02-06 RX ORDER — DIAZEPAM 5 MG/1
10 TABLET ORAL
Status: COMPLETED | OUTPATIENT
Start: 2025-02-06 | End: 2025-02-06

## 2025-02-06 RX ORDER — SODIUM CHLORIDE 0.9 % (FLUSH) 0.9 %
10 SYRINGE (ML) INJECTION
Status: DISCONTINUED | OUTPATIENT
Start: 2025-02-06 | End: 2025-02-06 | Stop reason: HOSPADM

## 2025-02-06 RX ORDER — FLURBIPROFEN SODIUM 0.3 MG/ML
SOLUTION/ DROPS OPHTHALMIC
Status: DISCONTINUED | OUTPATIENT
Start: 2025-02-06 | End: 2025-02-06 | Stop reason: HOSPADM

## 2025-02-06 RX ORDER — PREDNISOLONE ACETATE 10 MG/ML
SUSPENSION/ DROPS OPHTHALMIC
Status: DISCONTINUED | OUTPATIENT
Start: 2025-02-06 | End: 2025-02-06 | Stop reason: HOSPADM

## 2025-02-06 RX ORDER — MOXIFLOXACIN 5 MG/ML
SOLUTION/ DROPS OPHTHALMIC
Status: DISCONTINUED | OUTPATIENT
Start: 2025-02-06 | End: 2025-02-06 | Stop reason: HOSPADM

## 2025-02-06 RX ORDER — FENTANYL CITRATE 50 UG/ML
INJECTION, SOLUTION INTRAMUSCULAR; INTRAVENOUS
Status: DISCONTINUED | OUTPATIENT
Start: 2025-02-06 | End: 2025-02-06

## 2025-02-06 RX ORDER — CYCLOPENTOLAT/TROPIC/PHENYLEPH 1%-1%-2.5%
1 DROPS (EA) OPHTHALMIC (EYE)
Status: DISCONTINUED | OUTPATIENT
Start: 2025-02-06 | End: 2025-02-06 | Stop reason: HOSPADM

## 2025-02-06 RX ORDER — LIDOCAINE HYDROCHLORIDE 10 MG/ML
INJECTION, SOLUTION EPIDURAL; INFILTRATION; INTRACAUDAL; PERINEURAL
Status: DISCONTINUED | OUTPATIENT
Start: 2025-02-06 | End: 2025-02-06 | Stop reason: HOSPADM

## 2025-02-06 RX ORDER — EPINEPHRINE 1 MG/ML
INJECTION INTRAMUSCULAR; INTRAVENOUS; SUBCUTANEOUS
Status: DISCONTINUED | OUTPATIENT
Start: 2025-02-06 | End: 2025-02-06 | Stop reason: HOSPADM

## 2025-02-06 RX ORDER — TETRACAINE HYDROCHLORIDE 5 MG/ML
1 SOLUTION OPHTHALMIC
Status: DISCONTINUED | OUTPATIENT
Start: 2025-02-06 | End: 2025-02-06 | Stop reason: HOSPADM

## 2025-02-06 RX ORDER — DEXMEDETOMIDINE HYDROCHLORIDE 100 UG/ML
INJECTION, SOLUTION INTRAVENOUS
Status: DISCONTINUED | OUTPATIENT
Start: 2025-02-06 | End: 2025-02-06

## 2025-02-06 RX ORDER — MIDAZOLAM HYDROCHLORIDE 1 MG/ML
INJECTION INTRAMUSCULAR; INTRAVENOUS
Status: DISCONTINUED | OUTPATIENT
Start: 2025-02-06 | End: 2025-02-06

## 2025-02-06 RX ADMIN — Medication 1 DROP: at 05:02

## 2025-02-06 RX ADMIN — MIDAZOLAM HYDROCHLORIDE 2 MG: 1 INJECTION, SOLUTION INTRAMUSCULAR; INTRAVENOUS at 07:02

## 2025-02-06 RX ADMIN — DEXMEDETOMIDINE 10 MCG: 200 INJECTION, SOLUTION INTRAVENOUS at 08:02

## 2025-02-06 RX ADMIN — FENTANYL CITRATE 100 MCG: 50 INJECTION INTRAMUSCULAR; INTRAVENOUS at 07:02

## 2025-02-06 RX ADMIN — DIAZEPAM 10 MG: 5 TABLET ORAL at 06:02

## 2025-02-06 RX ADMIN — TETRACAINE HYDROCHLORIDE 1 DROP: 5 SOLUTION OPHTHALMIC at 05:02

## 2025-02-06 RX ADMIN — DEXMEDETOMIDINE 10 MCG: 200 INJECTION, SOLUTION INTRAVENOUS at 07:02

## 2025-02-06 NOTE — OP NOTE
Operative Note  Ophthalmology Service    Date of Procedure:  2/6/2025    Surgeon:  MD Tae    Staff Physician: Dr. Chavez    Pre-Operative Diagnosis: Combined forms of age related cataract OD    Post-Operative Diagnosis: Same as pre-operative diagnosis    Treatments/Procedures Performed:   1. Cataract extraction with phacoemulsification and posterior chamber intraocular lens placement OD    Intraoperative Findings: Combined forms of age related cataract, right eye    Anesthesia: Monitored anesthesia care.     Complications: None    Estimated Blood Loss: None    Implant:    Implant Name Type Inv. Item Serial No.  Lot No. LRB No. Used Action   ENVISTA HYDROPHOBIC ACRYLIC IOL   [id number]  [id number] Right 1 Implanted        Indication for Procedure:   The patient had a history of painless progressive vision loss interfering with activities of daily living.  Risks, benefits, alternatives, and complications were discussed thoroughly with the patient and the patient expressed understanding and a desire to proceed with the procedure. Informed consent was obtained, signed, and witnessed, and placed in the chart prior.     Procedure in detail:  The patient was brought to the operating room and placed in supine position.  A time out was performed including patient's name, date of birth, anticipated procedure, surgical site location, and allergies.  After adequate anesthesia was achieved, the patient was prepped and draped in sterile fashion using topical Betadine.     A sideport blade was used to make a paracentesis wound. Trypan blue was instilled into the anterior chamber to stain the anterior capsule. Subsequently 2% preservative-free lidocaine  was instilled into the anterior chamber. Visocat was then used to fill the anterior chamber. A 2.4 mm keratome blade was then used to make a clear corneal triplanar wound. A cystotome was used to make a tear in the anterior capsular flap, which was continued around  with Utrata forceps to complete a continuous curvilinear capsulorhexis. BSS was then used for hydrodissection and hydrodelineation. The lens was noted to spin freely in the bag. The lens was then removed in a divide-and-conquer manner with the phacoemulsification handpiece. Irrigation and aspiration handpiece was then used to remove the remaining cortical material. Provisc was then used to fill the capsular bag and the implanted lens was placed in the bag. During placement, the trailing haptic was caught in the  sleeve, outside of the main wound. The decision was made to partially cut the implanted lens with Vannas scissors and remove it from the eye through the main wound, which occurred without complication. A secondary lens was placed in the bag. The I&A was used to remove the remaining Provisc, and the wounds were hydrated with BSS. The wounds were noted to be watertight. The eye was noted to have a good physiological pressure. The lid speculum was removed under direct visualization. Topical Vigamox, Pred-Forte, and flurbiprofen eye drops were placed in the eye. The eye was then covered with a shield and patch. The patient tolerated the procedure well without complications.  The patient was brought to the recovery room in stable condition.    Garrett Strong  [facility] PGY3

## 2025-02-06 NOTE — BRIEF OP NOTE
Brief Operative Note  Ophthalmology Service    Date of Procedure: 2/6/2025     Attending Physician: Robert Chavez MD    Assistant: Garrett Strong MD    Pre-Operative Diagnosis: Combined forms of age-related cataract of both eyes [H25.813]  Cataract [H26.9]     Post-Operative Diagnosis: Same as pre-operative diagnosis    Treatments/Procedures:   Procedure(s) (LRB):  EXTRACTION, CATARACT, WITH IOL INSERTION (Right)    Intraoperative Findings: as expected    Anesthesia: General    Complications: None    Estimated Blood Loss: < 5 cc    Specimens: None    -------------------------------------------------------------  Full dictated Operative Report to follow.  -------------------------------------------------------------    Right eye cataract lens extraction.  Implant below. First lens with loading issues, subsequently was cut with vannas scissors and a new lens placed. Implant type below. Otherwise without complications.   Implant Name Type Inv. Item Serial No.  Lot No. LRB No. Used Action   ENVISTA HYDROPHOBIC ACRYLIC IOL   [id number]  [id number] Right 1 Implanted

## 2025-02-06 NOTE — ANESTHESIA POSTPROCEDURE EVALUATION
Anesthesia Post Evaluation    Patient: Parul Sanford    Procedure(s) Performed: Procedure(s) (LRB):  EXTRACTION, CATARACT, WITH IOL INSERTION (Right)    Final Anesthesia Type: MAC      Patient location during evaluation: PACU  Patient participation: Yes- Able to Participate  Level of consciousness: awake and responds to stimulation  Post-procedure vital signs: reviewed and stable  Pain management: adequate  Airway patency: patent    PONV status at discharge: No PONV  Anesthetic complications: no      Cardiovascular status: blood pressure returned to baseline  Respiratory status: unassisted  Hydration status: euvolemic  Follow-up not needed.          Vitals Value Taken Time   BP 96/68 02/06/25 0857   Temp 36.3 °C (97.3 °F) 02/06/25 0857   Pulse 78 02/06/25 0857   Resp 14 02/06/25 0857   SpO2 98 % 02/06/25 0857         No case tracking events are documented in the log.      Pain/Moo Score: Moo Score: 9 (2/6/2025  9:01 AM)

## 2025-02-06 NOTE — TRANSFER OF CARE
Anesthesia Transfer of Care Note    Patient: Parul Sanford    Procedure(s) Performed: Procedure(s) (LRB):  EXTRACTION, CATARACT, WITH IOL INSERTION (Right)    Patient location: PACU    Anesthesia Type: MAC    Transport from OR: Transported from OR on room air with adequate spontaneous ventilation    Post pain: adequate analgesia    Post assessment: no apparent anesthetic complications    Post vital signs: stable    Level of consciousness: awake    Nausea/Vomiting: no nausea/vomiting    Complications: none    Transfer of care protocol was followed      Last vitals: Visit Vitals  /69   Pulse 75   Temp 36.3 °C (97.3 °F) (Temporal)   Resp 16   Wt 69.9 kg (154 lb)   SpO2 98%   Breastfeeding No   BMI 27.28 kg/m²

## 2025-02-06 NOTE — DISCHARGE SUMMARY
Discharge Summary  Ophthalmology Service    Admit Date: 2/6/2025     Discharge Date: 2/6/2025     Attending Physician: Robert Chavez MD     Discharge Physician: Garrett Strong MD    Discharged Condition: Good    Reason for Admission: Combined forms of age-related cataract of both eyes [H25.813]  Cataract [H26.9]     Treatments/Procedures: Procedure(s) (LRB):  EXTRACTION, CATARACT, WITH IOL INSERTION (Right) (see dictated report for details).    Hospital Course: Stable    Consults: None    Significant Diagnostic Studies: None    Disposition: Home    Patient Instructions:   - Resume same diet as prior to surgery  - Limit activity, no heavy lifting >10lbs, no water to eye, no bending, straining, coughing  - Call MD for severe uncontrolled pain  - Follow-up with ophthalmology as instructed tomorrow. Keep shield on, do not start drops, will start drops after clinic tomorrow (appt 7:50am)    Patient Instructions:   Current Discharge Medication List        CONTINUE these medications which have NOT CHANGED    Details   amLODIPine (NORVASC) 10 MG tablet Take 10 mg by mouth every evening.      aspirin (ECOTRIN) 325 MG EC tablet Take 1 tablet by mouth every morning.      atorvastatin (LIPITOR) 40 MG tablet TK 1 T PO QD OR HIGH CHOLESTEROL      bisoprolol (ZEBETA) 5 MG tablet Take 5 mg by mouth.      JARDIANCE 10 mg tablet Take 10 mg by mouth.      olmesartan-hydrochlorothiazide (BENICAR HCT) 40-25 mg per tablet Take 1 tablet by mouth.      clindamycin (CLEOCIN) 300 MG capsule Take 300 mg by mouth 3 (three) times daily.      cloNIDine (CATAPRES) 0.1 MG tablet Take 0.1 mg by mouth as needed.      cyclobenzaprine (FLEXERIL) 5 MG tablet Take 5 mg by mouth 3 (three) times daily as needed.      glipiZIDE (GLUCOTROL) 10 MG tablet Take 10 mg by mouth 2 (two) times daily with meals.      losartan-hydrochlorothiazide 100-25 mg (HYZAAR) 100-25 mg per tablet Take 1 tablet by mouth once daily.      metFORMIN (GLUCOPHAGE) 500 MG tablet        MOUNJARO 2.5 mg/0.5 mL Maged SMARTSI.5 Milligram(s) SUB-Q Once a Week      TRUE METRIX GLUCOSE TEST STRIP Strp Inject 1 strip into the skin 2 (two) times daily.      TRUEPLUS LANCETS 33 gauge Misc Apply topically 2 (two) times daily.      ZETIA 10 mg tablet 1 tablet Orally Once a day for 30 days

## 2025-02-06 NOTE — H&P
Pre-Operative History & Physical  Ophthalmology      SUBJECTIVE:     History of Present Illness:  Patient is a 59 y.o. female presents with Combined forms of age-related cataract of both eyes [H25.813]  Cataract [H26.9].    MEDICATIONS:   Facility-Administered Medications Prior to Admission   Medication    sodium chloride 0.9% flush 10 mL     PTA Medications   Medication Sig    amLODIPine (NORVASC) 10 MG tablet Take 10 mg by mouth every evening.    aspirin (ECOTRIN) 325 MG EC tablet Take 1 tablet by mouth every morning.    atorvastatin (LIPITOR) 40 MG tablet TK 1 T PO QD OR HIGH CHOLESTEROL    bisoprolol (ZEBETA) 5 MG tablet Take 5 mg by mouth.    JARDIANCE 10 mg tablet Take 10 mg by mouth.    olmesartan-hydrochlorothiazide (BENICAR HCT) 40-25 mg per tablet Take 1 tablet by mouth.    clindamycin (CLEOCIN) 300 MG capsule Take 300 mg by mouth 3 (three) times daily.    cloNIDine (CATAPRES) 0.1 MG tablet Take 0.1 mg by mouth as needed.    cyclobenzaprine (FLEXERIL) 5 MG tablet Take 5 mg by mouth 3 (three) times daily as needed.    glipiZIDE (GLUCOTROL) 10 MG tablet Take 10 mg by mouth 2 (two) times daily with meals.    losartan-hydrochlorothiazide 100-25 mg (HYZAAR) 100-25 mg per tablet Take 1 tablet by mouth once daily.    metFORMIN (GLUCOPHAGE) 500 MG tablet  (Patient not taking: Reported on 2024)    MOUNJARO 2.5 mg/0.5 mL PnIj SMARTSI.5 Milligram(s) SUB-Q Once a Week    TRUE METRIX GLUCOSE TEST STRIP Strp Inject 1 strip into the skin 2 (two) times daily.    TRUEPLUS LANCETS 33 gauge Misc Apply topically 2 (two) times daily.    ZETIA 10 mg tablet 1 tablet Orally Once a day for 30 days       ALLERGIES: Review of patient's allergies indicates:  No Known Allergies    PAST MEDICAL HISTORY:   Past Medical History:   Diagnosis Date    Diabetes mellitus     High blood pressure      PAST SURGICAL HISTORY:   Past Surgical History:   Procedure Laterality Date    EYE SURGERY Right 2024     PAST FAMILY HISTORY: No  family history on file.  SOCIAL HISTORY:   Social History     Tobacco Use    Smoking status: Never    Smokeless tobacco: Never   Substance Use Topics    Alcohol use: Not Currently    Drug use: Never        MENTAL STATUS: Alert    REVIEW OF SYSTEMS: Negative    OBJECTIVE:     Vital Signs (Most Recent)  Temp: 97.5 °F (36.4 °C) (02/06/25 0501)  Pulse: 76 (02/06/25 0501)  Resp: 18 (02/06/25 0501)  BP: (!) 150/77 (02/06/25 0501)  SpO2: 100 % (02/06/25 0501)    Physical Exam:  General: NAD  HEENT: atraumatic  Lungs: Adequate respirations  Heart: + pulses  Abdomen: Soft    ASSESSMENT/PLAN:     Patient is a 59 y.o. female with right eye cataract     - Risks/benefits/alternatives of the procedure including, but not limited to, infection, bleeding, pain, macular edema, corneal edema, persistent corneal defect, retinal tears, retinal detachment, loss of vision, loss of the eye, were  discussed with the patient and/or family. The patient/family voiced good understanding, the informed consent was signed, witnessed, and placed in chart. All patient and family questions were answered.   - Will proceed with right eye CEIOL  - Plan for local MAC  - Allergies reviewed: Review of patient's allergies indicates:  No Known Allergies  - patient is on aspirin, is okay to continue, stopped mounjaro 01/27/25    Garrett Strong MD  [facility]

## 2025-02-07 ENCOUNTER — OFFICE VISIT (OUTPATIENT)
Dept: OPHTHALMOLOGY | Facility: CLINIC | Age: 60
End: 2025-02-07
Payer: COMMERCIAL

## 2025-02-07 VITALS — HEIGHT: 63 IN | BODY MASS INDEX: 27.31 KG/M2 | WEIGHT: 154.13 LBS

## 2025-02-07 DIAGNOSIS — Z98.890 POST-OPERATIVE STATE: Primary | ICD-10-CM

## 2025-02-07 PROCEDURE — 99214 OFFICE O/P EST MOD 30 MIN: CPT | Mod: PBBFAC,PN

## 2025-02-07 RX ORDER — FLURBIPROFEN SODIUM 0.3 MG/ML
1 SOLUTION/ DROPS OPHTHALMIC 4 TIMES DAILY
Qty: 2.5 ML | Refills: 1 | Status: SHIPPED | OUTPATIENT
Start: 2025-02-07 | End: 2026-02-07

## 2025-02-07 RX ORDER — PREDNISOLONE ACETATE 10 MG/ML
1 SUSPENSION/ DROPS OPHTHALMIC EVERY 4 HOURS
Qty: 5 ML | Refills: 3 | Status: SHIPPED | OUTPATIENT
Start: 2025-02-07 | End: 2026-02-07

## 2025-02-07 RX ORDER — MOXIFLOXACIN 5 MG/ML
1 SOLUTION/ DROPS OPHTHALMIC 4 TIMES DAILY
Qty: 3 ML | Refills: 3 | Status: SHIPPED | OUTPATIENT
Start: 2025-02-07

## 2025-02-07 NOTE — PROGRESS NOTES
HPI     Post-op Evaluation     Additional comments: 1 week            Comments    S/P 2/6/25 CEIOL OD          Last edited by Alma Flores MA on 2/11/2025 10:54 AM.        OCT mac 02/11/2025  OD: CMT 20, poor signal, no obvious IRF/SRF  OS: , intact foveal contour, no IRF/SRF. atrophy      Assessment /Plan     For exam results, see Encounter Report.    Post-operative state    Other orders  -     fluorescein ophthalmic strip 1 each      Bscan 6/27/24: VH, Retina flat/attached, no masses    1) Visually Significant Age-Related Cataract, Right Eye  - DOS 02/06/2025 with Dr. Strong and Dr. Brian carrillo negative, doing well, IOP ok  - drops   Pred-forte increase to six times daily    Vigamox QID   Flurbiprofen QID  - no water to eye, no bending straining or heavy lifting  - RD and endophthalmitis precautions reviewed  - 2/11/25: Stop vigamox. Given persistent 2+ cell, increase pred-forte to six times daily. Continue ketorolac QID. Drop handout given. Will have patient return in 1 week for IOP check, AC check, will taper quickly    --- not directly addressed ---   1. POW #1 s/p PPV/EL/FAx, right eye (8/1/23)  - For NCVH 2/2 PDR   - Intra-op: Dense VH, taut hyaloid   - Doing well, VA 20/100 -1, no pain, flashes, floaters, or black curtains. Doing well with drops.   - Med instructions: Stop Vigamox, Taper PF 3-2-1-stop, decrease Atropine to daily  Reminded of likely continued progression of cataract  Reminded of likely appearance of gas bubble within vision  - Positioning instructions: No special positioning   - RD/Endophthalmitis/Return precautions discussed    Not directly addressed today:    2. DM2 w/ PDR and VH, OD  - Last A1c 7.3% 04/2024 per patient  - Encouraged good BG/HTN control  - Patient with new VH from [facility]  - VH onset 03/24  - had NVI 06/24 on arrival - now resolved  - EUGENE OD #1 given 5/1/24  - See above     2. DM2 w/ PDR, non CI DME OS  - had NVI on arrival 06/24, now  resolved  - s/p periph PRP fill 6/27/24 w/o complication. Tough view d/t asteroid.   - OCTm 6/24: non CI DME  - Needs tx OS but PRP will be difficult 2/2 asteroid hyalosis  - EUGENE OS #1 given 6/5/24  - Follow OCT for ME progression    3. Hypertensive retinopathy, left eye  -Grade 1-2  - BP control  - Follow FP for progression    4. Asteroid Hyalosis, OS  - Stable, CTM    5. Combined Cataract, OU  - NVS  - CTM

## 2025-02-07 NOTE — PROGRESS NOTES
HPI     Post-op Evaluation     Additional comments: EXTRACTION, CATARACT, WITH IOL INSERTION (right)   2/07/2025           Comments    POD1 EXTRACTION, CATARACT, WITH IOL INSERTION (right) 2/07/2025  Patient states that she is doing well with a little soreness but doing ok.             Last edited by Savanah Toth MA on 2/7/2025  8:19 AM.        OCT mac 02/07/2025  OD: CMT 20, poor signal, no obvious IRF/SRF  OS: , intact foveal contour, no IRF/SRF. atrophy      Assessment /Plan     For exam results, see Encounter Report.    Post-operative state    Other orders  -     flurbiprofen (OCUFEN) 0.03 % ophthalmic solution; Place 1 drop into the right eye 4 (four) times daily.  Dispense: 2.5 mL; Refill: 1  -     moxifloxacin (VIGAMOX) 0.5 % ophthalmic solution; Place 1 drop into the right eye 4 (four) times daily.  Dispense: 3 mL; Refill: 3  -     prednisoLONE acetate (PRED FORTE) 1 % DrpS; Place 1 drop into the right eye every 4 (four) hours.  Dispense: 5 mL; Refill: 3        Bscan 6/27/24: VH, Retina flat/attached, no masses    1) Visually Significant Age-Related Cataract, Right Eye  - DOS 02/06/2025 with Dr. Strong and Dr. Kathy carrillo negative, doing well, IOP ok  - drops   Pred-forte Qid   Vigamox QID   Flurbiprofen QID  - no water to eye, no bending straining or heavy lifting  - RD and endophthalmitis precautions reviewed  - post operative hand out given  - RTC one week    --- not directly addressed ---       1. POW #1 s/p PPV/EL/FAx, right eye (8/1/23)  - For NCVH 2/2 PDR   - Intra-op: Dense VH, taut hyaloid   - Doing well, VA 20/100 -1, no pain, flashes, floaters, or black curtains. Doing well with drops.   - Med instructions: Stop Vigamox, Taper PF 3-2-1-stop, decrease Atropine to daily  Reminded of likely continued progression of cataract  Reminded of likely appearance of gas bubble within vision  - Positioning instructions: No special positioning   - RD/Endophthalmitis/Return precautions  discussed    RTC 3-4 weeks for POM#1, sooner PRN if any problems (ezekiel pain, redness, dimming or loss of vision) - in [place]     Can Schedule follow up with us once finished post-op with Retina in [place]    Not directly addressed today:    2. DM2 w/ PDR and VH, OD  - Last A1c 7.3% 04/2024 per patient  - Encouraged good BG/HTN control  - Patient with [facility] VH from [facility]  - VH onset 03/24  - had NVI 06/24 on arrival - now resolved  - EUGENE OD #1 given 5/1/24  - See above     2. DM2 w/ PDR, non CI DME OS  - had NVI on arrival 06/24, now resolved  - s/p periph PRP fill 6/27/24 w/o complication. Tough view d/t asteroid.   - OCTm 6/24: non CI DME  - Needs tx OS but PRP will be difficult 2/2 asteroid hyalosis  - EUGENE OS #1 given 6/5/24  - Follow OCT for ME progression    3. Hypertensive retinopathy, left eye  -Grade 1-2  - BP control  - Follow FP for progression    4. Asteroid Hyalosis, OS  - Stable, CTM    5. Combined Cataract, OU  - NVS  - CTM

## 2025-02-07 NOTE — LETTER
February 7, 2025      [facility]  [address] [place] [address]  Phone: [phone]       Patient: Parul Sanford   YOB: 1965  Date of Visit: 02/07/2025    To Whom It May Concern:    Nazanin Sanford  was at [facility] on 02/07/2025. The patient may return to work in 3-4 weeks with no restrictions, but will be unable to do any lifting, straining, or bending until then. If you have any questions or concerns, or if I can be of further assistance, please do not hesitate to contact me.    Sincerely,    Garrett Strong MD

## 2025-02-11 ENCOUNTER — OFFICE VISIT (OUTPATIENT)
Dept: OPHTHALMOLOGY | Facility: CLINIC | Age: 60
End: 2025-02-11
Payer: COMMERCIAL

## 2025-02-11 VITALS — BODY MASS INDEX: 27.31 KG/M2 | WEIGHT: 154.13 LBS | HEIGHT: 63 IN

## 2025-02-11 DIAGNOSIS — Z98.890 POST-OPERATIVE STATE: Primary | ICD-10-CM

## 2025-02-11 PROCEDURE — 99213 OFFICE O/P EST LOW 20 MIN: CPT | Mod: PBBFAC,PN

## 2025-02-11 RX ORDER — CLONIDINE HYDROCHLORIDE 0.1 MG/1
1 TABLET ORAL EVERY MORNING
COMMUNITY

## 2025-03-21 ENCOUNTER — OFFICE VISIT (OUTPATIENT)
Dept: OPHTHALMOLOGY | Facility: CLINIC | Age: 60
End: 2025-03-21
Payer: COMMERCIAL

## 2025-03-21 VITALS — WEIGHT: 154.13 LBS | HEIGHT: 63 IN | BODY MASS INDEX: 27.31 KG/M2

## 2025-03-21 DIAGNOSIS — Z98.890 POST-OPERATIVE STATE: Primary | ICD-10-CM

## 2025-03-21 PROCEDURE — 99213 OFFICE O/P EST LOW 20 MIN: CPT | Mod: PBBFAC,PN

## 2025-03-21 RX ORDER — DORZOLAMIDE HYDROCHLORIDE AND TIMOLOL MALEATE 20; 5 MG/ML; MG/ML
1 SOLUTION/ DROPS OPHTHALMIC 2 TIMES DAILY
Qty: 10 ML | Refills: 1 | Status: SHIPPED | OUTPATIENT
Start: 2025-03-21 | End: 2026-03-21

## 2025-03-21 RX ORDER — ATROPINE SULFATE 10 MG/ML
1 SOLUTION/ DROPS OPHTHALMIC DAILY
Qty: 5 ML | Refills: 1 | Status: SHIPPED | OUTPATIENT
Start: 2025-03-21

## 2025-03-21 RX ORDER — NEOMYCIN SULFATE, POLYMYXIN B SULFATE AND DEXAMETHASONE 3.5; 10000; 1 MG/ML; [USP'U]/ML; MG/ML
1 SUSPENSION/ DROPS OPHTHALMIC
Qty: 5 ML | Refills: 2 | Status: SHIPPED | OUTPATIENT
Start: 2025-03-21

## 2025-03-21 NOTE — PROGRESS NOTES
HPI     Post-op Evaluation     Additional comments: CEIOL OD 2-6-25           Comments    s/p PPV/EL/FAx, right eye (8/1/23)          Last edited by Alma Flores MA on 3/21/2025  7:46 AM.            Assessment /Plan     For exam results, see Encounter Report.    Post-operative state  -     neomycin-polymyxin-dexamethasone (MAXITROL) 3.5mg/mL-10,000 unit/mL-0.1 % DrpS; Place 1 drop into the right eye 6 (six) times daily.  Dispense: 5 mL; Refill: 2  -     atropine 1% (ISOPTO ATROPINE) 1 % Drop; Place 1 drop into the right eye once daily.  Dispense: 5 mL; Refill: 1  -     dorzolamide-timolol 2-0.5% (COSOPT) 22.3-6.8 mg/mL ophthalmic solution; Place 1 drop into the right eye 2 (two) times daily.  Dispense: 10 mL; Refill: 1          OCT mac 02/11/2025  OD: CMT 20, poor signal, no obvious IRF/SRF  OS: , intact foveal contour, no IRF/SRF. atrophy    Bscan 6/27/24: VH, Retina flat/attached, no masses    1) S/p CEIOL, Right Eye  2) Vitreous hemorrhage, right eye  - DOS 02/06/2025 with Dr. Strong and Dr. Torres  - pt with persistent 2+ cell at POW1, increased PF to 6x/day  - 3/21/25: POM1, pt has not used any drops in 2 weeks, having pain and irritation. VA CF, IOP 34, AC deep with 3+ cell, 2+ flare, no hypopyon. Cornea with unruptured bulla at main wound. Lens in good position but dense VH posteriorly. B scan without evidence of RD. Given history of noncompliance, will simply antibiotic/steroid to maxitrol.   - Start Cosopt BID OD  - Start Atropine Daily OD  - Start Maxitrol 6x/day right eye  - Recommend laying at 30 degree angle. No heavy lifting. Avoid additional NSAIDs or aspirin unless medically indicated by other providers   - Discussed with patient that vision may get worst before gets better as blood diffuses through vitreous.    - Retinal detachment return precautions given.       - RTC 1 week AC/IOP check    --- not directly addressed ---   1. POW #1 s/p PPV/EL/FAx, right eye (8/1/23)  - For [facility] 2/2  PDR   - Intra-op: Dense VH, taut hyaloid   - Doing well, VA 20/100 -1, no pain, flashes, floaters, or black curtains. Doing well with drops.   - Med instructions: Stop Vigamox, Taper PF 3-2-1-stop, decrease Atropine to daily  Reminded of likely continued progression of cataract  Reminded of likely appearance of gas bubble within vision  - Positioning instructions: No special positioning   - RD/Endophthalmitis/Return precautions discussed    Not directly addressed today:    2. DM2 w/ PDR and VH, OD  - Last A1c 7.3% 04/2024 per patient  - Encouraged good BG/HTN control  - Patient with new VH from [facility]  - VH onset 03/24  - had NVI 06/24 on arrival - now resolved  - EUGENE OD #1 given 5/1/24  - See above     2. DM2 w/ PDR, non CI DME OS  - had NVI on arrival 06/24, now resolved  - s/p periph PRP fill 6/27/24 w/o complication. Tough view d/t asteroid.   - OCTm 6/24: non CI DME  - Needs tx OS but PRP will be difficult 2/2 asteroid hyalosis  - EUGENE OS #1 given 6/5/24  - Follow OCT for ME progression    3. Hypertensive retinopathy, left eye  -Grade 1-2  - BP control  - Follow FP for progression    4. Asteroid Hyalosis, OS  - Stable, CTM    5. Combined Cataract, OU  - NVS  - CTM

## 2025-03-28 ENCOUNTER — OFFICE VISIT (OUTPATIENT)
Dept: OPHTHALMOLOGY | Facility: CLINIC | Age: 60
End: 2025-03-28
Payer: COMMERCIAL

## 2025-03-28 VITALS — BODY MASS INDEX: 27.31 KG/M2 | WEIGHT: 154.13 LBS | HEIGHT: 63 IN

## 2025-03-28 DIAGNOSIS — Z98.890 POST-OPERATIVE STATE: Primary | ICD-10-CM

## 2025-03-28 PROCEDURE — 99214 OFFICE O/P EST MOD 30 MIN: CPT | Mod: PBBFAC,PN

## 2025-03-28 RX ORDER — HYDROXYZINE HYDROCHLORIDE 25 MG/1
25 TABLET, FILM COATED ORAL 3 TIMES DAILY PRN
COMMUNITY

## 2025-03-28 RX ORDER — PREDNISONE 10 MG/1
TABLET ORAL
COMMUNITY

## 2025-03-28 NOTE — PROGRESS NOTES
HPI    RTC 1 Week AC/IOP Check  OCT attempted today.   Last edited by Jayna Gan on 3/28/2025 11:29 AM.            Assessment /Plan     For exam results, see Encounter Report.    Post-operative state            OCT mac 02/11/2025  OD: CMT 20, poor signal, no obvious IRF/SRF  OS: , intact foveal contour, no IRF/SRF. atrophy    Bscan 6/27/24: VH, Retina flat/attached, no masses    1) S/p CEIOL, Right Eye  2) Vitreous hemorrhage, right eye  - DOS 02/06/2025 with Dr. Strong and Dr. Torres  - pt with persistent 2+ cell at POW1, increased PF to 6x/day  - 3/21/25: POM1, pt has not used any drops in 2 weeks, having pain and irritation. VA CF, IOP 34, AC deep with 3+ cell, 2+ flare, no hypopyon. Cornea with unruptured bulla at main wound. Lens in good position but dense VH posteriorly. B scan without evidence of RD. Given history of noncompliance, will simply antibiotic/steroid to maxitrol.   - 3/28/25: 1 week after pom1 appt, doing well. Feels pain has improved quite a bit and vision is slightly clearer. Exam with much improved anterior inflammation, still with VH only slightly improved. Repeat b scan without evidence of RD. NVI seen in both eyes, see below.     3) NVI OU  - Right eye: now with improved view, peripupillary NVI visible at 12 and 6  - Left eye: 360 NVI at pupillary margin. IOP slightly elevated at 24.   - likely cause of IOP spike OD and borderline elevated IOP OS  - recommend injection today 3/28/25 however patient declined as she is on transportation. Also offered to have patient wait and see Dr. Hood (glaucoma specialist) however pt declined due to transportation issues. Will bring patient back for intravitreal avastin at next procedure day, 1 or both eyes. In meantime, start cosopt in left eye as well as right eye      - continue Cosopt BID OD, Start Cosopt BID OS  - continue Atropine Daily OD  - Decrease Maxitrol to 4x/day right eye  - Recommend laying at 30 degree angle. No heavy  lifting. Avoid additional NSAIDs or aspirin unless medically indicated by other providers   - Discussed with patient that vision may get worst before gets better as blood diffuses through vitreous.    - Retinal detachment return precautions given.       RTC next procedure day for IOP check, DFE, Injection OD vs. OS vs. OU    --- not directly addressed ---   1. POW #1 s/p PPV/EL/FAx, right eye (8/1/23)  - For [facility] 2/2 PDR   - Intra-op: Dense VH, taut hyaloid   - Doing well, VA 20/100 -1, no pain, flashes, floaters, or black curtains. Doing well with drops.   - Med instructions: Stop Vigamox, Taper PF 3-2-1-stop, decrease Atropine to daily  Reminded of likely continued progression of cataract  Reminded of likely appearance of gas bubble within vision  - Positioning instructions: No special positioning   - RD/Endophthalmitis/Return precautions discussed    Not directly addressed today:    2. DM2 w/ PDR and VH, OD  - Last A1c 7.3% 04/2024 per patient  - Encouraged good BG/HTN control  - Patient with new VH from [facility]  - VH onset 03/24  - had NVI 06/24 on arrival - now resolved  - EUGENE OD #1 given 5/1/24  - See above     2. DM2 w/ PDR, non CI DME OS  - had NVI on arrival 06/24, now resolved  - s/p periph PRP fill 6/27/24 w/o complication. Tough view d/t asteroid.   - OCTm 6/24: non CI DME  - Needs tx OS but PRP will be difficult 2/2 asteroid hyalosis  - EUGENE OS #1 given 6/5/24  - Follow OCT for ME progression    3. Hypertensive retinopathy, left eye  -Grade 1-2  - BP control  - Follow FP for progression    4. Asteroid Hyalosis, OS  - Stable, CTM    5. Combined Cataract, OU  - NVS  - CTM

## 2025-04-23 ENCOUNTER — CLINICAL SUPPORT (OUTPATIENT)
Dept: OPHTHALMOLOGY | Facility: CLINIC | Age: 60
End: 2025-04-23
Payer: COMMERCIAL

## 2025-04-23 VITALS — HEIGHT: 63 IN | WEIGHT: 154.13 LBS | BODY MASS INDEX: 27.31 KG/M2

## 2025-04-23 DIAGNOSIS — E11.3593 PROLIFERATIVE DIABETIC RETINOPATHY OF BOTH EYES ASSOCIATED WITH TYPE 2 DIABETES MELLITUS, MACULAR EDEMA PRESENCE UNSPECIFIED: Primary | ICD-10-CM

## 2025-04-23 PROCEDURE — 92134 CPTRZ OPH DX IMG PST SGM RTA: CPT | Mod: PBBFAC,PN

## 2025-04-23 PROCEDURE — 92134 CPTRZ OPH DX IMG PST SGM RTA: CPT | Mod: PBBFAC,PN | Performed by: OPHTHALMOLOGY

## 2025-04-23 PROCEDURE — 67028 INJECTION EYE DRUG: CPT | Mod: PBBFAC,PN,RT

## 2025-04-23 PROCEDURE — 99214 OFFICE O/P EST MOD 30 MIN: CPT | Mod: PBBFAC,PN

## 2025-04-23 RX ORDER — AMLODIPINE BESYLATE 10 MG/1
10 TABLET ORAL
COMMUNITY

## 2025-04-23 RX ADMIN — Medication 1.25 MG: at 12:04

## 2025-04-23 RX ADMIN — BALANCED SALT SOLUTION 15 ML: 6.4; .75; .48; .3; 3.9; 1.7 SOLUTION OPHTHALMIC at 12:04

## 2025-04-23 NOTE — PROGRESS NOTES
OCT Mac 4/23/2025  OD: 228, absent foveal contour, IRF/SRF, ERM  OS: 273, preserved foveal contour, no IRF/SRF, atrophy    OCT mac 02/11/2025  OD: CMT 20, poor signal, no obvious IRF/SRF  OS: , intact foveal contour, no IRF/SRF. atrophy    Bscan 6/27/24: VH, Retina flat/attached, no masses    1) S/p CEIOL, Right Eye  2) Vitreous hemorrhage, right eye  - DOS 02/06/2025 with Dr. Strong and Dr. Torres  - pt with persistent 2+ cell at POW1, increased PF to 6x/day  - 3/21/25: POM1, pt has not used any drops in 2 weeks, having pain and irritation. VA CF, IOP 34, AC deep with 3+ cell, 2+ flare, no hypopyon. Cornea with unruptured bulla at main wound. Lens in good position but dense VH posteriorly. B scan without evidence of RD. Given history of noncompliance, will simply antibiotic/steroid to maxitrol.   - 3/28/25: 1 week after pom1 appt, doing well. Feels pain has improved quite a bit and vision is slightly clearer. Exam with much improved anterior inflammation, still with VH only slightly improved. Repeat b scan without evidence of RD. NVI seen in both eyes, see below.   - 4/23/2025: NVI OD on exam. VH improved OD today, but still present. OCT Mac with significant subfoveal IRF/SRF. Pt does have ERM, but has tolerated EUGENE OD previously and with NVI still recommend EUGENE OD. Recommend EUGENE OD today. R/B/A discussed, pt elects to proceed with procedure. Consent signed and witness.     3) NVI OU  - Right eye: now with improved view, peripupillary NVI visible at 12 and 6  - Left eye: 360 NVI at pupillary margin. IOP slightly elevated at 24.   - likely cause of IOP spike OD and borderline elevated IOP OS  - recommend injection today 3/28/25 however patient declined as she is on transportation. Also offered to have patient wait and see Dr. Hood (glaucoma specialist) however pt declined due to transportation issues. Will bring patient back for intravitreal avastin at next procedure day, 1 or both eyes. In meantime,  start cosopt in left eye as well as right eye.   - 4/23/2025: EUGENE OD 4/23/2025, will need EUGENE OS for NVI in 2 weeks.       - Continue Cosopt BID OU  - Continue Atropine Daily OD  - Continue Maxitrol to 4x/day right eye  - Recommend laying at 30 degree angle. No heavy lifting. Avoid additional NSAIDs or aspirin unless medically indicated by other providers   - Discussed with patient that vision may get worst before gets better as blood diffuses through vitreous.    - Retinal detachment return precautions given.       RTC next procedure day for IOP check, DFE, injection OS    --- not directly addressed ---   1. POW #1 s/p PPV/EL/FAx, right eye (8/1/23)  - For [facility] 2/2 PDR   - Intra-op: Dense VH, taut hyaloid   - Doing well, VA 20/100 -1, no pain, flashes, floaters, or black curtains. Doing well with drops.   - Med instructions: Stop Vigamox, Taper PF 3-2-1-stop, decrease Atropine to daily  Reminded of likely continued progression of cataract  Reminded of likely appearance of gas bubble within vision  - Positioning instructions: No special positioning   - RD/Endophthalmitis/Return precautions discussed    Not directly addressed today:    2. DM2 w/ PDR and VH, OD  - Last A1c 7.3% 04/2024 per patient  - Encouraged good BG/HTN control  - Patient with new VH from [facility]  - VH onset 03/24  - had NVI 06/24 on arrival - now resolved  - EUGENE OD #1 given 5/1/24  - See above     2. DM2 w/ PDR, non CI DME OS  - had NVI on arrival 06/24, now resolved  - s/p periph PRP fill 6/27/24 w/o complication. Tough view d/t asteroid.   - OCTm 6/24: non CI DME  - Needs tx OS but PRP will be difficult 2/2 asteroid hyalosis  - EUGENE OS #1 given 6/5/24  - Follow OCT for ME progression    3. Hypertensive retinopathy, left eye  -Grade 1-2  - BP control  - Follow FP for progression    4. Asteroid Hyalosis, OS  - Stable, CTM    5. Combined Cataract, OU  - NVS  - CTM    Procedure Note:     Procedure: lntravitreal injection of Avastin, right  eye    Route of administration: lntravitreal  Dosage: 1.25mg  Amount administered: 0.05cc    Pre and post procedure Diagnosis: PDR, OU    Description of Procedure:   I have explained the risks, benefits, and alternatives of the procedure in detail. The patient voices understanding, and all questions have been answered.  The patient agrees to proceed as planned. Signed consent was obtained, the site was marked, and a timeout was performed. Topical proparacaine 0.5% was used for anesthesia. A lid speculum was placed. Topical Povidone-Iodine 5% was used for antisepsis. The injection was then placed 3.5-4 mm posterior to the inferotemporal limbus. The eyelid speculum was removed, and the eye was thoroughly irrigated with sterile saline.  No complications were observed, the patient tolerated procedure well, and vision was confirmed to be count fingers or better after the injection. The patient was educated that mild irritation tonight and tomorrow was normal secondary to topical Povidone-Iodine use. We discussed warning signs of endophthalmitis, and the patient was further educated that if significant pain or vision loss occurred, they should return immediately to our clinic or the ED.

## 2025-04-24 ENCOUNTER — TELEPHONE (OUTPATIENT)
Dept: OPHTHALMOLOGY | Facility: CLINIC | Age: 60
End: 2025-04-24
Payer: COMMERCIAL

## 2025-04-24 PROBLEM — E11.3593 PROLIFERATIVE DIABETIC RETINOPATHY OF BOTH EYES ASSOCIATED WITH TYPE 2 DIABETES MELLITUS: Status: ACTIVE | Noted: 2025-04-24

## 2025-06-03 ENCOUNTER — TELEPHONE (OUTPATIENT)
Dept: OPHTHALMOLOGY | Facility: CLINIC | Age: 60
End: 2025-06-03
Payer: COMMERCIAL

## 2025-07-14 ENCOUNTER — TELEPHONE (OUTPATIENT)
Dept: OPHTHALMOLOGY | Facility: CLINIC | Age: 60
End: 2025-07-14
Payer: COMMERCIAL

## (undated) DEVICE — DRESSING TRANS 2X2 TEGADERM

## (undated) DEVICE — CONTAINER SPECIMEN OR STER 4OZ

## (undated) DEVICE — SYR 3CC LUER LOC

## (undated) DEVICE — NDL MAGELLAN SAFETY 18G 1.5IN

## (undated) DEVICE — TAPE CURAD PAPER ADH 1INX10YD

## (undated) DEVICE — IMPLANTABLE DEVICE: Type: IMPLANTABLE DEVICE | Site: EYE | Status: NON-FUNCTIONAL

## (undated) DEVICE — PACK CATARACT BAUSCH AND LOMB

## (undated) DEVICE — PROTECTOR ONE-STEP ARM REG

## (undated) DEVICE — Device

## (undated) DEVICE — GLOVE SIGNATURE MICRO LTX 8

## (undated) DEVICE — GLOVE SIGNATURE MICRO LTX 7

## (undated) DEVICE — NDL FLTR 5MCRN BLNT TIP 18GX1

## (undated) DEVICE — KIT SURGICAL TURNOVER